# Patient Record
Sex: FEMALE | Race: WHITE | NOT HISPANIC OR LATINO | Employment: UNEMPLOYED | URBAN - NONMETROPOLITAN AREA
[De-identification: names, ages, dates, MRNs, and addresses within clinical notes are randomized per-mention and may not be internally consistent; named-entity substitution may affect disease eponyms.]

---

## 2020-06-11 ENCOUNTER — APPOINTMENT (OUTPATIENT)
Dept: CT IMAGING | Facility: HOSPITAL | Age: 45
End: 2020-06-11

## 2020-06-11 ENCOUNTER — HOSPITAL ENCOUNTER (EMERGENCY)
Facility: HOSPITAL | Age: 45
Discharge: HOME | End: 2020-06-12
Attending: EMERGENCY MEDICINE | Admitting: EMERGENCY MEDICINE

## 2020-06-11 DIAGNOSIS — R10.30 LOWER ABDOMINAL PAIN: Primary | ICD-10-CM

## 2020-06-11 DIAGNOSIS — D72.829 LEUKOCYTOSIS, UNSPECIFIED TYPE: ICD-10-CM

## 2020-06-11 LAB
ALBUMIN SERPL-MCNC: 4.7 G/DL (ref 3.5–5.2)
ALBUMIN/GLOB SERPL: 2 G/DL
ALP SERPL-CCNC: 43 U/L (ref 39–117)
ALT SERPL W P-5'-P-CCNC: 7 U/L (ref 1–33)
ANION GAP SERPL CALCULATED.3IONS-SCNC: 11.5 MMOL/L (ref 5–15)
AST SERPL-CCNC: 18 U/L (ref 1–32)
B-HCG UR QL: NEGATIVE
BASOPHILS # BLD AUTO: 0.07 10*3/MM3 (ref 0–0.2)
BASOPHILS NFR BLD AUTO: 0.5 % (ref 0–1.5)
BILIRUB SERPL-MCNC: 0.9 MG/DL (ref 0.2–1.2)
BILIRUB UR QL STRIP: NEGATIVE
BUN BLD-MCNC: 6 MG/DL (ref 6–20)
BUN/CREAT SERPL: 7.1 (ref 7–25)
CALCIUM SPEC-SCNC: 10 MG/DL (ref 8.6–10.5)
CHLORIDE SERPL-SCNC: 98 MMOL/L (ref 98–107)
CLARITY UR: CLEAR
CO2 SERPL-SCNC: 25.5 MMOL/L (ref 22–29)
COLOR UR: YELLOW
CREAT BLD-MCNC: 0.84 MG/DL (ref 0.57–1)
DEPRECATED RDW RBC AUTO: 41.3 FL (ref 37–54)
EOSINOPHIL # BLD AUTO: 0.16 10*3/MM3 (ref 0–0.4)
EOSINOPHIL NFR BLD AUTO: 1.1 % (ref 0.3–6.2)
ERYTHROCYTE [DISTWIDTH] IN BLOOD BY AUTOMATED COUNT: 12.6 % (ref 12.3–15.4)
GFR SERPL CREATININE-BSD FRML MDRD: 74 ML/MIN/1.73
GLOBULIN UR ELPH-MCNC: 2.4 GM/DL
GLUCOSE BLD-MCNC: 105 MG/DL (ref 65–99)
GLUCOSE UR STRIP-MCNC: NEGATIVE MG/DL
HCT VFR BLD AUTO: 38.3 % (ref 34–46.6)
HGB BLD-MCNC: 13.1 G/DL (ref 12–15.9)
HGB UR QL STRIP.AUTO: NEGATIVE
IMM GRANULOCYTES # BLD AUTO: 0.05 10*3/MM3 (ref 0–0.05)
IMM GRANULOCYTES NFR BLD AUTO: 0.3 % (ref 0–0.5)
KETONES UR QL STRIP: NEGATIVE
LEUKOCYTE ESTERASE UR QL STRIP.AUTO: NEGATIVE
LIPASE SERPL-CCNC: 24 U/L (ref 13–60)
LYMPHOCYTES # BLD AUTO: 1.18 10*3/MM3 (ref 0.7–3.1)
LYMPHOCYTES NFR BLD AUTO: 8 % (ref 19.6–45.3)
MCH RBC QN AUTO: 30.5 PG (ref 26.6–33)
MCHC RBC AUTO-ENTMCNC: 34.2 G/DL (ref 31.5–35.7)
MCV RBC AUTO: 89.3 FL (ref 79–97)
MONOCYTES # BLD AUTO: 1.2 10*3/MM3 (ref 0.1–0.9)
MONOCYTES NFR BLD AUTO: 8.1 % (ref 5–12)
NEUTROPHILS # BLD AUTO: 12.08 10*3/MM3 (ref 1.7–7)
NEUTROPHILS NFR BLD AUTO: 82 % (ref 42.7–76)
NITRITE UR QL STRIP: NEGATIVE
NRBC BLD AUTO-RTO: 0 /100 WBC (ref 0–0.2)
PH UR STRIP.AUTO: 7 [PH] (ref 5–8)
PLATELET # BLD AUTO: 251 10*3/MM3 (ref 140–450)
PMV BLD AUTO: 9.4 FL (ref 6–12)
POTASSIUM BLD-SCNC: 4 MMOL/L (ref 3.5–5.2)
PROT SERPL-MCNC: 7.1 G/DL (ref 6–8.5)
PROT UR QL STRIP: NEGATIVE
RBC # BLD AUTO: 4.29 10*6/MM3 (ref 3.77–5.28)
SODIUM BLD-SCNC: 135 MMOL/L (ref 136–145)
SP GR UR STRIP: <=1.005 (ref 1–1.03)
UROBILINOGEN UR QL STRIP: NORMAL
WBC NRBC COR # BLD: 14.74 10*3/MM3 (ref 3.4–10.8)

## 2020-06-11 PROCEDURE — 85025 COMPLETE CBC W/AUTO DIFF WBC: CPT | Performed by: PHYSICIAN ASSISTANT

## 2020-06-11 PROCEDURE — 99283 EMERGENCY DEPT VISIT LOW MDM: CPT

## 2020-06-11 PROCEDURE — 25010000002 IOPAMIDOL 61 % SOLUTION: Performed by: EMERGENCY MEDICINE

## 2020-06-11 PROCEDURE — 83690 ASSAY OF LIPASE: CPT | Performed by: PHYSICIAN ASSISTANT

## 2020-06-11 PROCEDURE — 80053 COMPREHEN METABOLIC PANEL: CPT | Performed by: PHYSICIAN ASSISTANT

## 2020-06-11 PROCEDURE — 81025 URINE PREGNANCY TEST: CPT | Performed by: PHYSICIAN ASSISTANT

## 2020-06-11 PROCEDURE — 81003 URINALYSIS AUTO W/O SCOPE: CPT | Performed by: PHYSICIAN ASSISTANT

## 2020-06-11 PROCEDURE — 74177 CT ABD & PELVIS W/CONTRAST: CPT

## 2020-06-11 RX ORDER — ESCITALOPRAM OXALATE 10 MG/1
10 TABLET ORAL DAILY
COMMUNITY

## 2020-06-11 RX ORDER — ACETAMINOPHEN 325 MG/1
650 TABLET ORAL EVERY 6 HOURS PRN
Status: DISCONTINUED | OUTPATIENT
Start: 2020-06-11 | End: 2020-06-12 | Stop reason: HOSPADM

## 2020-06-11 RX ORDER — ONDANSETRON 4 MG/1
4 TABLET, ORALLY DISINTEGRATING ORAL EVERY 8 HOURS PRN
Qty: 6 TABLET | Refills: 0 | Status: SHIPPED | OUTPATIENT
Start: 2020-06-11 | End: 2020-06-13

## 2020-06-11 RX ORDER — ONDANSETRON 2 MG/ML
4 INJECTION INTRAMUSCULAR; INTRAVENOUS ONCE
Status: DISCONTINUED | OUTPATIENT
Start: 2020-06-11 | End: 2020-06-12 | Stop reason: HOSPADM

## 2020-06-11 RX ORDER — DICYCLOMINE HCL 20 MG
20 TABLET ORAL EVERY 6 HOURS PRN
Qty: 12 TABLET | Refills: 0 | Status: SHIPPED | OUTPATIENT
Start: 2020-06-11 | End: 2020-06-14

## 2020-06-11 RX ORDER — SODIUM CHLORIDE 0.9 % (FLUSH) 0.9 %
10 SYRINGE (ML) INJECTION AS NEEDED
Status: DISCONTINUED | OUTPATIENT
Start: 2020-06-11 | End: 2020-06-12 | Stop reason: HOSPADM

## 2020-06-11 RX ORDER — MORPHINE SULFATE 4 MG/ML
4 INJECTION, SOLUTION INTRAMUSCULAR; INTRAVENOUS ONCE
Status: DISCONTINUED | OUTPATIENT
Start: 2020-06-11 | End: 2020-06-12 | Stop reason: HOSPADM

## 2020-06-11 RX ADMIN — SODIUM CHLORIDE 1000 ML: 9 INJECTION, SOLUTION INTRAVENOUS at 22:40

## 2020-06-11 RX ADMIN — IOPAMIDOL 75 ML: 612 INJECTION, SOLUTION INTRAVENOUS at 22:59

## 2020-06-12 VITALS
DIASTOLIC BLOOD PRESSURE: 60 MMHG | OXYGEN SATURATION: 97 % | RESPIRATION RATE: 18 BRPM | HEART RATE: 96 BPM | HEIGHT: 64 IN | WEIGHT: 100.2 LBS | SYSTOLIC BLOOD PRESSURE: 101 MMHG | TEMPERATURE: 100.4 F | BODY MASS INDEX: 17.11 KG/M2

## 2020-06-12 NOTE — DISCHARGE INSTRUCTIONS
Pain could be related to a viral illness.  There are no signs of appendicitis or other underlying infection on your work-up today.  May take Bentyl as needed for abdominal cramping.  Take ondansetron as needed for nausea vomiting.  Try to eat a bland diet for the next few days to prevent further GI upset.  You can return here to the ER if your symptoms worsen.  You will need to follow your primary care provider as early as possible to evaluate your symptoms, may contact [facility] if you will be here in town for the next few weeks to months.  Return to the ER for any change, worsening symptoms, or any additional concerns including but not limited to severe worsening abdominal pain, persistent fever, difficulty urinating, bloody bowel movements, intractable vomiting, productive cough, chest pain, difficulty breathing.

## 2020-06-12 NOTE — ED PROVIDER NOTES
Subjective   Patient is a 44-year-old female with no significant past medical history presenting to ER for evaluation of abdominal pain.  Patient states that last around on around 9 PM she began having diffuse pains in her abdomen that she describes as cramping in nature.  She states she does have problems with constipation from time to time so she took Linzess and a stool softener.  She states the pain persisted to today and localized near her lower abdomen.  She states that she gave herself a water enema and did have a nonbloody bowel movement.  She states the pain seems to be worse with walking or certain movements.  She had no known fever but did have a temp of 100.4 upon arrival here in the ER.  She denies any headache, dizziness, vision loss or changes, shortness of breath, chest pain, productive cough, dysuria, hematuria, abnormal vaginal discharge, or any other symptoms.  Patient states that she has had colonoscopies and endoscopies but no other abdominal surgeries.  She states that she is from [place] but her family is here in [place] and she has been here for the past 2 months during the pandemic.          Review of Systems   Constitutional: Positive for fever. Negative for chills.   HENT: Negative.    Eyes: Negative.    Respiratory: Negative for cough and shortness of breath.    Cardiovascular: Negative for chest pain.   Gastrointestinal: Positive for abdominal pain. Negative for blood in stool, nausea and vomiting.   Genitourinary: Negative.    Musculoskeletal: Negative.    Skin: Negative.    Allergic/Immunologic: Negative for immunocompromised state.   Neurological: Negative.    Psychiatric/Behavioral: Negative.        History reviewed. No pertinent past medical history.    No Known Allergies    Past Surgical History:   Procedure Laterality Date   • HIP SURGERY         History reviewed. No pertinent family history.    Social History     Socioeconomic History   • Marital status: Single     Spouse  "name: Not on file   • Number of children: Not on file   • Years of education: Not on file   • Highest education level: Not on file   Tobacco Use   • Smoking status: Never Smoker   Substance and Sexual Activity   • Alcohol use: Not Currently   • Drug use: Never           Objective   Physical Exam   Nursing note and vitals reviewed.  /60   Pulse 96   Temp 100.4 °F (38 °C) (Oral)   Resp 18   Ht 161.3 cm (63.5\")   Wt 45.5 kg (100 lb 3.2 oz)   LMP 05/25/2020   SpO2 97%   BMI 17.47 kg/m²     GEN: No acute distress, sitting up on the stretcher.  Awake and alert.  Does not appear toxic.  Head: Normocephalic, atraumatic  Eyes: EOM intact  ENT: Mask in place  Cardiovascular: Regular rate  Lungs: Clear to auscultation bilaterally without adventitious sounds.  Abdomen: Flat, nondistended.  Bowel sounds are present in all 4 quadrants.  Soft, tender to palpation in right lower quadrant without significant guarding  Extremities: No edema, normal appearance, full ROM.  Neuro: GCS 15  Psych: Mood and affect are appropriate    Procedures           ED Course  ED Course as of Jun 12 0028   Thu Jun 11, 2020   2308 WBC(!): 14.74 [LA]   2308 Hemoglobin: 13.1 [LA]   2308 Platelets: 251 [LA]   2308 Neutrophil Rel %(!): 82.0 [LA]   2308 Lymphocyte Rel %(!): 8.0 [LA]   2308 Lymphocyte Rel %(!): 8.0 [LA]   2308 Eosinophil Rel %: 1.1 [LA]   2308 Basophil Rel %: 0.5 [LA]   2308 Immature Granulocyte Rel %: 0.3 [LA]   2308 HCG, Urine QL: Negative [LA]   2308 Color, UA: Yellow [LA]   2308 Appearance, UA: Clear [LA]   2308 pH, UA: 7.0 [LA]   2308 Specific Gravity, UA: <=1.005 [LA]   2308 Glucose: Negative [LA]   2308 Ketones, UA: Negative [LA]   2308 Bilirubin, UA: Negative [LA]   2308 Blood, UA: Negative [LA]   2308 Protein, UA: Negative [LA]   2308 Leukocytes, UA: Negative [LA]   2308 Nitrite, UA: Negative [LA]   2308 Urobilinogen, UA: 0.2 E.U./dL [LA]   2308 Lipase: 24 [LA]   2308 Glucose(!): 105 [LA]   2308 BUN: 6 [LA]   2308 " Creatinine: 0.84 [LA]   2308 Sodium(!): 135 [LA]   2308 Potassium: 4.0 [LA]   2308 Chloride: 98 [LA]   2308 CO2: 25.5 [LA]   2309 Calcium: 10.0 [LA]   2309 Total Protein: 7.1 [LA]   2309 Albumin: 4.70 [LA]   2309 ALT (SGPT): 7 [LA]   2309 AST (SGOT): 18 [LA]   2309 Total Bilirubin: 0.9 [LA]   2309 eGFR Non  Am: 74 [LA]   2309 Globulin: 2.4 [LA]   2309 A/G Ratio: 2.0 [LA]   2309 BUN/Creatinine Ratio: 7.1 [LA]   2309 Anion Gap: 11.5 [LA]   2334 CT as read by the radiologist reveals no acute abnormality.  Normal appendix.  Discussed with Dr. Dow.  We will give patient Bentyl, Zofran, discussed diet changes and close follow-up and strict return precautions.      [LA]   2344 Patient is resting comfortably in stretcher.  Discussed all findings.  She declined a pain medication.  Discussed follow-up and strict return precautions.  She verbalized understanding was in agreement with this plan of care.     [LA]   2352 FINAL REPORT     TECHNIQUE:  Routine axial images through the abdomen and pelvis were  obtained following IV and oral contrast administration.     CLINICAL HISTORY:  RLQ abdominal pain, fever     FINDINGS:  Abdomen: The gallbladder is normal.  The solid abdominal organs  and ureters are unremarkable.  The GI tract is unremarkable,  with no sign of appendicitis.    Pelvis: The uterus, ovaries and  urinary bladder are normal.  There is no pelvic or abdominal  ascites, adenopathy or acute osseous abnormality.     IMPRESSION:  No acute disease.     Authenticated by Carlos Mackay M.D. on 06/11/2020 11:46:24 PM    [LA]   Fri Jun 12, 2020   0008 RN informed me that patient is requesting COVID-19 swab.  We will perform this here and have her quarantine at home until she gets the results    [LA]   0020 RN attempted to perform the swab patient stopped her and states that she did not want this anymore.  I have very low suspicion for COVID-19 anyway.     [LA]      ED Course User Index  [LA] Lidia Knutson PA-C                                            MDM  Number of Diagnoses or Management Options  Leukocytosis, unspecified type:   Lower abdominal pain:   Diagnosis management comments: On arrival, patient is stable.  She does have a temperature 100.4.  She is no acute distress.  Differential includes appendicitis, colitis, obstruction, nephrolithiasis, and other concerns.  Lower concern for PID, ectopic pregnancy or ovarian torsion.  Will obtain basic labs including a lipase, UPT, UA.  Will obtain CT abdomen pelvis with contrast, give IV fluids, pain and nausea medicine.    On work-up, patient had a leukocytosis of 14.74 with 82% neutrophils.  Hemoglobin stable.  Her sodium was 135 glucose is 105, no other significant lecture abnormalities.  Urine with clean without signs of infection.  UPT was negative.  Lipase was within normal limits.  CT is read by the radiologist revealed no acute abnormalities, no signs of appendicitis.  Patient was resting comfortably in stretcher.  She declined the pain medication that we had ordered initially.  Discussed all findings the patient discussed the case with Dr. Dow as well.  Patient may have a viral illness causing her symptoms.  Discussed Bentyl, Zofran, diet changes, strict return precautions.  Patient verbalized understanding was in agreement this plan of care.  Just prior to discharge she asked the nurse if we could swab her for COVID-19.  I ordered the test but as the nurse tried to perform the test the patient stopped her abruptly did not want it performed.  This was canceled.  I have low concern for COVID-19 anyway.       Amount and/or Complexity of Data Reviewed  Clinical lab tests: reviewed and ordered  Tests in the radiology section of CPT®: reviewed and ordered  Discussion of test results with the performing providers: yes  Review and summarize past medical records: yes  Discuss the patient with other providers: yes    Risk of Complications, Morbidity, and/or  Mortality  Presenting problems: moderate  Diagnostic procedures: moderate  Management options: low    Patient Progress  Patient progress: stable      Final diagnoses:   Lower abdominal pain   Leukocytosis, unspecified type            Lidia Knutson PA-C  06/12/20 0028

## 2024-12-17 ENCOUNTER — LAB (OUTPATIENT)
Dept: LAB | Facility: HOSPITAL | Age: 49
End: 2024-12-17
Payer: MEDICAID

## 2024-12-17 ENCOUNTER — TRANSCRIBE ORDERS (OUTPATIENT)
Dept: LAB | Facility: HOSPITAL | Age: 49
End: 2024-12-17
Payer: COMMERCIAL

## 2024-12-17 DIAGNOSIS — C70.9 MALIGNANT NEOPLASM OF MENINGES: Primary | ICD-10-CM

## 2024-12-17 DIAGNOSIS — C70.9 MALIGNANT NEOPLASM OF MENINGES: ICD-10-CM

## 2024-12-17 LAB — B-HCG UR QL: NEGATIVE

## 2024-12-17 PROCEDURE — 81025 URINE PREGNANCY TEST: CPT

## 2024-12-17 PROCEDURE — 36415 COLL VENOUS BLD VENIPUNCTURE: CPT
